# Patient Record
Sex: FEMALE | Race: WHITE | NOT HISPANIC OR LATINO | URBAN - METROPOLITAN AREA
[De-identification: names, ages, dates, MRNs, and addresses within clinical notes are randomized per-mention and may not be internally consistent; named-entity substitution may affect disease eponyms.]

---

## 2020-10-19 ENCOUNTER — APPOINTMENT (RX ONLY)
Dept: URBAN - METROPOLITAN AREA OTHER 5 | Facility: OTHER | Age: 60
Setting detail: DERMATOLOGY
End: 2020-10-19

## 2020-10-19 DIAGNOSIS — Z41.1 ENCOUNTER FOR COSMETIC SURGERY: ICD-10-CM

## 2020-10-19 PROCEDURE — ? PATIENT SPECIFIC COUNSELING

## 2020-10-19 PROCEDURE — ? BOTOX

## 2020-10-19 ASSESSMENT — LOCATION ZONE DERM: LOCATION ZONE: FACE

## 2020-10-19 ASSESSMENT — LOCATION SIMPLE DESCRIPTION DERM: LOCATION SIMPLE: RIGHT FOREHEAD

## 2020-10-19 ASSESSMENT — LOCATION DETAILED DESCRIPTION DERM: LOCATION DETAILED: RIGHT SUPERIOR MEDIAL FOREHEAD

## 2020-10-19 NOTE — PROCEDURE: BOTOX
Postcare Instructions: Patient instructed to not lie down for 4 hours and limit physical activity for 24 hours. Patient instructed not to travel by airplane for 48 hours.
Lot #: [id number]
Forehead Units: 10
Additional Area 1 Location: [facility]
Additional Area 2 Location: crow’s feet
Expiration Date (Month Year): 12/2022
Nasal Root Units: 0
Consent: Written consent was obtained prior to the procedure. Risks, benefits, expectations and alternatives were discussed including, but not limited to, infection, bleeding, lid/brow ptosis, bruising, swelling, diplopia, temporary effects, incomplete chemical denervation and dissatisfaction with the cosmetic outcome. No guarantee or warranty was given or implied regarding longevity of results.
Map Statment: See attached map for complete details
Dilution (U/0.1 Cc): 1.1
Detail Level: Detailed
Price Per Unit In $ (Use Numbers Only, No Text Please.): 17
Additional Area 1 Units: 5
Additional Area 2 Units: 15
Use Map Statement For Sites (Optional): Yes
Bill Summary Price Listed Below, Or Bill Total Of Units X Price Per Unit?: Bill #Units x Price Per Unit
Reconstitution Date: 10/19/2020

## 2020-10-19 NOTE — PROCEDURE: PATIENT SPECIFIC COUNSELING
Detail Level: Zone
Other (Free Text): Dr. Romero verbal dictation:She has recovered from her bout with Covid. She will soon want to schedule to go ahead with the breast and or facial surgery. For today she would like Botox.\\n\\nI prepped her skin and with informed consent injected TIMUR with five units of Botox 2.5 to each mentalis and 7.5 to each cross foot five units to each  2.5 units to each frontalis. She tolerated it well.

## 2020-12-21 ENCOUNTER — APPOINTMENT (RX ONLY)
Dept: URBAN - METROPOLITAN AREA OTHER 5 | Facility: OTHER | Age: 60
Setting detail: DERMATOLOGY
End: 2020-12-21

## 2020-12-21 DIAGNOSIS — Z41.1 ENCOUNTER FOR COSMETIC SURGERY: ICD-10-CM

## 2020-12-21 PROCEDURE — ? BOTOX

## 2020-12-21 PROCEDURE — ? CONSULTATION - AGING FACE

## 2020-12-21 PROCEDURE — ? PATIENT SPECIFIC COUNSELING

## 2020-12-21 ASSESSMENT — LOCATION ZONE DERM: LOCATION ZONE: FACE

## 2020-12-21 ASSESSMENT — LOCATION SIMPLE DESCRIPTION DERM: LOCATION SIMPLE: LEFT CHEEK

## 2020-12-21 ASSESSMENT — LOCATION DETAILED DESCRIPTION DERM: LOCATION DETAILED: LEFT INFERIOR CENTRAL MALAR CHEEK

## 2020-12-21 NOTE — PROCEDURE: PATIENT SPECIFIC COUNSELING
Detail Level: Zone
Other (Free Text): Dr. Morena Romero verbal dictation:\\n\\n  She would like Botox. I prepped her skin and with informed consent injected 2.5 units to each TIMUR ,2.5  units to each mentalis , 5 units to each crows foot , 2.5 units to each Frontalis , 5 units to each  , 2.5 units to the Procerus Which she tolerated rated well.

## 2020-12-21 NOTE — PROCEDURE: BOTOX
Platsyma Units: 0
Detail Level: Detailed
Reconstitution Date: 12/2020
Map Statment: See attached map for complete details
Additional Area 1 Units: 10
Postcare Instructions: Patient instructed to not lie down for 4 hours and limit physical activity for 24 hours. Patient instructed not to travel by airplane for 48 hours.
Additional Area 2 Units: 5
Bill Summary Price Listed Below, Or Bill Total Of Units X Price Per Unit?: Bill #Units x Price Per Unit
Show Price In Note?: yes
Price Per Unit In $ (Use Numbers Only, No Text Please.): 17
Dilution (U/0.1 Cc): 2.2
Additional Area 1 Location: crow?s
Consent: Written consent was obtained prior to the procedure. Risks, benefits, expectations and alternatives were discussed including, but not limited to, infection, bleeding, lid/brow ptosis, bruising, swelling, diplopia, temporary effects, incomplete chemical denervation and dissatisfaction with the cosmetic outcome. No guarantee or warranty was given or implied regarding longevity of results.
Glabellar Complex Units: 12.5
Expiration Date (Month Year): 08/2023
Additional Area 2 Location: [facility]
Lot #: [id number]

## 2021-07-16 ENCOUNTER — APPOINTMENT (RX ONLY)
Dept: URBAN - METROPOLITAN AREA OTHER 5 | Facility: OTHER | Age: 61
Setting detail: DERMATOLOGY
End: 2021-07-16

## 2021-07-16 DIAGNOSIS — Z41.1 ENCOUNTER FOR COSMETIC SURGERY: ICD-10-CM

## 2021-07-16 PROCEDURE — ? PATIENT SPECIFIC COUNSELING

## 2021-07-16 PROCEDURE — ? BOTOX

## 2021-07-16 ASSESSMENT — LOCATION DETAILED DESCRIPTION DERM: LOCATION DETAILED: LEFT INFERIOR CENTRAL MALAR CHEEK

## 2021-07-16 ASSESSMENT — LOCATION SIMPLE DESCRIPTION DERM: LOCATION SIMPLE: LEFT CHEEK

## 2021-07-16 ASSESSMENT — LOCATION ZONE DERM: LOCATION ZONE: FACE

## 2021-07-16 NOTE — HPI: FACE (AGING FACE)
Is This A New Presentation, Or A Follow-Up?: Follow Up Aging Face
Additional History: Patient presents today for botox

## 2021-07-16 NOTE — PROCEDURE: BOTOX
Dilution (U/0.1 Cc): 2.2
Wei Units: 0
Consent: Written consent was obtained prior to the procedure. Risks, benefits, expectations and alternatives were discussed including, but not limited to, infection, bleeding, lid/brow ptosis, bruising, swelling, diplopia, temporary effects, incomplete chemical denervation and dissatisfaction with the cosmetic outcome. No guarantee or warranty was given or implied regarding longevity of results.
Price Per Unit In $ (Use Numbers Only, No Text Please.): 17
Bill Summary Price Listed Below, Or Bill Total Of Units X Price Per Unit?: Bill #Units x Price Per Unit
Map Statment: See attached map for complete details
Use Map Statement For Sites (Optional): Yes
Additional Area 1 Units: 15
Detail Level: Detailed
Reconstitution Date: 07/2021
Lot #: [id number]
Postcare Instructions: Patient instructed to not lie down for 4 hours and limit physical activity for 24 hours. Patient instructed not to travel by airplane for 48 hours.
Forehead Units: 10
Glabellar Complex Units: 20
Additional Area 1 Location: crow’s feet
Expiration Date (Month Year): 10/2023

## 2021-07-16 NOTE — PROCEDURE: PATIENT SPECIFIC COUNSELING
Detail Level: Zone
Other (Free Text): Dr. Morena Romero verbal dictation:\\n\\n  She would like Botox. With informed consent I prepped her skin injected each crows foot with 7.5 units each  with 7.5 units, 5 Units to each frontalis and 5 units to the procerus us. She tolerated it well.

## 2021-10-08 ENCOUNTER — APPOINTMENT (RX ONLY)
Dept: URBAN - METROPOLITAN AREA OTHER 5 | Facility: OTHER | Age: 61
Setting detail: DERMATOLOGY
End: 2021-10-08

## 2021-10-08 DIAGNOSIS — Z41.1 ENCOUNTER FOR COSMETIC SURGERY: ICD-10-CM

## 2021-10-08 PROCEDURE — ? PRE-OP WORKLIST

## 2021-10-08 PROCEDURE — ? PRESCRIPTION

## 2021-10-08 PROCEDURE — ? BOTOX

## 2021-10-08 PROCEDURE — ? PATIENT SPECIFIC COUNSELING

## 2021-10-08 PROCEDURE — ? OTHER

## 2021-10-08 RX ORDER — CELECOXIB 200 MG/1
CAPSULE ORAL
Qty: 12 | Refills: 0 | Status: ERX | COMMUNITY
Start: 2021-10-08

## 2021-10-08 RX ADMIN — CELECOXIB: 200 CAPSULE ORAL at 00:00

## 2021-10-08 ASSESSMENT — LOCATION ZONE DERM: LOCATION ZONE: FACE

## 2021-10-08 ASSESSMENT — LOCATION SIMPLE DESCRIPTION DERM: LOCATION SIMPLE: LEFT CHEEK

## 2021-10-08 ASSESSMENT — LOCATION DETAILED DESCRIPTION DERM: LOCATION DETAILED: LEFT INFERIOR CENTRAL MALAR CHEEK

## 2021-10-08 NOTE — HPI: PREOPERATIVE APPOINTMENT
Has The Patient Completed Informed Consent?: is scheduled to complete informed consent documentation during this visit
Has The Patient Fulfilled Financial Responsibilities For Surgical Scheduling?: is scheduled to complete payment to fulfill financial responsibilities during this visit
Have Arrangements And Instructions Been Made For Patient Transportation?: transportation arrangements will be made
Date Of Procedure?: 10/21/2021
Facility: [facility]
Surgeon: Dr. Morena Romero
Additional History: Patient presents today for preop

## 2021-10-08 NOTE — PROCEDURE: BOTOX
Perioral Units: 0
Glabellar Complex Units: 10
Use Map Statement For Sites (Optional): Yes
Lot #: [id number]
Detail Level: Detailed
Additional Area 1 Location: crow’s feet
Consent: Written consent was obtained prior to the procedure. Risks, benefits, expectations and alternatives were discussed including, but not limited to, infection, bleeding, lid/brow ptosis, bruising, swelling, diplopia, temporary effects, incomplete chemical denervation and dissatisfaction with the cosmetic outcome. No guarantee or warranty was given or implied regarding longevity of results.
Price Per Unit In $ (Use Numbers Only, No Text Please.): 17
Expiration Date (Month Year): 11/2023
Bill Summary Price Listed Below, Or Bill Total Of Units X Price Per Unit?: Bill #Units x Price Per Unit
Dilution (U/0.1 Cc): 2.2
Reconstitution Date: 10/2021
Map Statment: See attached map for complete details
Additional Area 1 Units: 15
Postcare Instructions: Patient instructed to not lie down for 4 hours and limit physical activity for 24 hours. Patient instructed not to travel by airplane for 48 hours.

## 2021-10-08 NOTE — PROCEDURE: PRE-OP WORKLIST
Date Of Evaluation: 10/08/2021
Detail Level: Simple
Date Of Surgery: 10/21/2021
Surgeon: Dr Morena Romero
Photos Taken?: yes
Surgery Scheduled: Secondary Face/neck lift, fat grafting, facial peel

## 2021-10-08 NOTE — PROCEDURE: OTHER
Detail Level: Zone
Render Risk Assessment In Note?: no
Other (Free Text): Dr Romero dictation\\n\\n  She’s here for preop. We plan a secondary face and neck lift. A previous facelift was a short scar facelift so there’s no scars behind her ear given that she has a lot of skin laxity in the neck I suggested the incision along the occipital hairline. She has declined because she likes to wear her hair up. She also said she understands that I may not be able to tighten the neck as effectively.\\n\\nI’ve discussed the procedure risks likely outcome possible complications all of which she understands.\\n\\nI injected her with Botox today 7.5 units to the crows foot on each side. She had a symmetry of the brow so I injected to 7.5 to the right  of five to the left 2.5 to each frontalis and 2.5 to the procerus.

## 2021-10-12 RX ORDER — ONDANSETRON 8 MG/1
TABLET, ORALLY DISINTEGRATING ORAL
Qty: 10 | Refills: 0 | Status: ERX | COMMUNITY
Start: 2021-10-12

## 2021-10-12 RX ORDER — VALACYCLOVIR HYDROCHLORIDE 1 G/1
TABLET, FILM COATED ORAL
Qty: 10 | Refills: 0 | Status: ERX | COMMUNITY
Start: 2021-10-12

## 2021-10-12 RX ORDER — CLONIDINE HYDROCHLORIDE 0.1 MG/1
TABLET ORAL
Qty: 30 | Refills: 0 | Status: ERX | COMMUNITY
Start: 2021-10-12

## 2021-10-12 RX ORDER — TRIAMTERENE AND HYDROCHLOROTHIAZIDE 37.5; 25 MG/1; MG/1
TABLET ORAL
Qty: 30 | Refills: 0 | Status: ERX | COMMUNITY
Start: 2021-10-12

## 2021-10-12 RX ORDER — GABAPENTIN 300 MG/1
CAPSULE ORAL
Qty: 12 | Refills: 0 | Status: ERX | COMMUNITY
Start: 2021-10-12

## 2021-10-12 RX ORDER — METHYLPREDNISOLONE 4 MG/1
TABLET ORAL
Qty: 1 | Refills: 0 | Status: ERX | COMMUNITY
Start: 2021-10-12

## 2021-10-12 RX ADMIN — METHYLPREDNISOLONE: 4 TABLET ORAL at 00:00

## 2021-10-12 RX ADMIN — VALACYCLOVIR HYDROCHLORIDE: 1 TABLET, FILM COATED ORAL at 00:00

## 2021-10-12 RX ADMIN — GABAPENTIN: 300 CAPSULE ORAL at 00:00

## 2021-10-12 RX ADMIN — TRIAMTERENE AND HYDROCHLOROTHIAZIDE: 37.5; 25 TABLET ORAL at 00:00

## 2021-10-12 RX ADMIN — CLONIDINE HYDROCHLORIDE: 0.1 TABLET ORAL at 00:00

## 2021-10-12 RX ADMIN — ONDANSETRON: 8 TABLET, ORALLY DISINTEGRATING ORAL at 00:00

## 2021-10-25 ENCOUNTER — APPOINTMENT (RX ONLY)
Dept: URBAN - METROPOLITAN AREA OTHER 5 | Facility: OTHER | Age: 61
Setting detail: DERMATOLOGY
End: 2021-10-25

## 2021-10-25 VITALS — HEART RATE: 85 BPM | SYSTOLIC BLOOD PRESSURE: 122 MMHG | DIASTOLIC BLOOD PRESSURE: 93 MMHG | RESPIRATION RATE: 16 BRPM

## 2021-10-25 DIAGNOSIS — Z48.89 ENCOUNTER FOR OTHER SPECIFIED SURGICAL AFTERCARE: ICD-10-CM

## 2021-10-25 PROCEDURE — 99024 POSTOP FOLLOW-UP VISIT: CPT

## 2021-10-25 PROCEDURE — ? PATIENT SPECIFIC COUNSELING

## 2021-10-25 PROCEDURE — ? OTHER

## 2021-10-25 PROCEDURE — ? COUNSELING - POST-OP CHECK, FACELIFT

## 2021-10-25 ASSESSMENT — LOCATION DETAILED DESCRIPTION DERM: LOCATION DETAILED: LEFT INFERIOR CENTRAL MALAR CHEEK

## 2021-10-25 ASSESSMENT — LOCATION SIMPLE DESCRIPTION DERM: LOCATION SIMPLE: LEFT CHEEK

## 2021-10-25 ASSESSMENT — LOCATION ZONE DERM: LOCATION ZONE: FACE

## 2021-10-25 NOTE — PROCEDURE: PATIENT SPECIFIC COUNSELING
Other (Free Text): Dr. Morena Romero verbal dictation:\\n\\n  She is four days postop and doing well. No hematoma ,no seroma,  no sign of infection or DVT.\\n\\nShe wanted to take a pain pill before we injected her for the drain removal. Following her taking the pain pill I infiltrated with xylocaine and epi then removed the drains without any problems.\\n\\nI have given her instructions to avoid any physical activities. Continue to apply the ointment to the suture lines.\\n\\nShe’ll return on Friday for removal of the staples and sutures.
Detail Level: Zone

## 2021-10-27 ENCOUNTER — APPOINTMENT (RX ONLY)
Dept: URBAN - METROPOLITAN AREA OTHER 5 | Facility: OTHER | Age: 61
Setting detail: DERMATOLOGY
End: 2021-10-27

## 2021-10-27 DIAGNOSIS — Z48.89 ENCOUNTER FOR OTHER SPECIFIED SURGICAL AFTERCARE: ICD-10-CM

## 2021-10-27 PROCEDURE — 99024 POSTOP FOLLOW-UP VISIT: CPT

## 2021-10-27 PROCEDURE — ? OTHER

## 2021-10-27 PROCEDURE — ? PATIENT SPECIFIC COUNSELING

## 2021-10-27 PROCEDURE — ? COUNSELING - POST-OP CHECK, FACELIFT

## 2021-10-27 ASSESSMENT — LOCATION ZONE DERM: LOCATION ZONE: FACE

## 2021-10-27 ASSESSMENT — LOCATION DETAILED DESCRIPTION DERM: LOCATION DETAILED: LEFT INFERIOR CENTRAL MALAR CHEEK

## 2021-10-27 ASSESSMENT — LOCATION SIMPLE DESCRIPTION DERM: LOCATION SIMPLE: LEFT CHEEK

## 2021-10-27 NOTE — PROCEDURE: PATIENT SPECIFIC COUNSELING
Detail Level: Zone
Other (Free Text): Dr. Morena Romero verbal dictation :\\n\\n   Six days post op.She sent us a photo earlier today saying the right side of her neck was swollen. On the image I was concerned she may have a seroma. We asked her to come in.\\n\\nOn exam both sides of her neck are fine. No seroma and no hematoma. Just the usual swelling. I reassured her and we removed half of her staples today.

## 2021-10-27 NOTE — HPI: POST-OP CHECK, FACELIFT
History: Patient presents today post op facelift\\nPatient was concerning about swelling right neck area

## 2021-11-05 ENCOUNTER — APPOINTMENT (RX ONLY)
Dept: URBAN - METROPOLITAN AREA OTHER 5 | Facility: OTHER | Age: 61
Setting detail: DERMATOLOGY
End: 2021-11-05

## 2021-11-05 DIAGNOSIS — Z48.89 ENCOUNTER FOR OTHER SPECIFIED SURGICAL AFTERCARE: ICD-10-CM

## 2021-11-05 PROCEDURE — ? PATIENT SPECIFIC COUNSELING

## 2021-11-05 PROCEDURE — ? COUNSELING - POST-OP CHECK, FACELIFT

## 2021-11-05 PROCEDURE — ? OTHER

## 2021-11-05 PROCEDURE — ? COUNSELING - POST-OP CHECK, FAT TRANSFER

## 2021-11-05 PROCEDURE — 99024 POSTOP FOLLOW-UP VISIT: CPT

## 2021-11-05 ASSESSMENT — LOCATION DETAILED DESCRIPTION DERM
LOCATION DETAILED: LEFT INFERIOR MEDIAL MALAR CHEEK
LOCATION DETAILED: LEFT INFERIOR CENTRAL MALAR CHEEK

## 2021-11-05 ASSESSMENT — LOCATION ZONE DERM: LOCATION ZONE: FACE

## 2021-11-05 ASSESSMENT — LOCATION SIMPLE DESCRIPTION DERM: LOCATION SIMPLE: LEFT CHEEK

## 2021-11-05 NOTE — PROCEDURE: PATIENT SPECIFIC COUNSELING
Detail Level: Zone
Other (Free Text): Dr. Morena Romero verbal dictation:\\n\\n  She’s just over two weeks postop doing well and pleased with the early results. The only concern is that she feels the left ear lobe is attached and the right is free.\\n\\nExamination\\n\\nShe looks good. No hematoma ,no seroma ,and no sign of infection. All the wounds have healed. There is an asymmetry of the earlobes and I explain to her that once everything has settled we can consider  revision.\\nI removed a few sutures. We’ve given her instructions for the further care of her face and I’ll see her again in three weeks.

## 2021-11-05 NOTE — PROCEDURE: COUNSELING - POST-OP CHECK, FAT TRANSFER
Add Postop Global No-Charge Code (31428)?: yes
Count Minor/Major Decisions Toward Mdm (Not Cosmetic)?: No
Detail Level: Simple

## 2021-11-22 ENCOUNTER — APPOINTMENT (RX ONLY)
Dept: URBAN - METROPOLITAN AREA OTHER 5 | Facility: OTHER | Age: 61
Setting detail: DERMATOLOGY
End: 2021-11-22

## 2021-11-22 DIAGNOSIS — Z48.89 ENCOUNTER FOR OTHER SPECIFIED SURGICAL AFTERCARE: ICD-10-CM

## 2021-11-22 PROCEDURE — ? OTHER

## 2021-11-22 PROCEDURE — 99024 POSTOP FOLLOW-UP VISIT: CPT

## 2021-11-22 PROCEDURE — ? COUNSELING - POST-OP CHECK, FACELIFT

## 2021-11-22 PROCEDURE — ? COUNSELING - POST-OP CHECK, FAT TRANSFER

## 2021-11-22 PROCEDURE — ? PATIENT SPECIFIC COUNSELING

## 2021-11-22 ASSESSMENT — LOCATION ZONE DERM: LOCATION ZONE: FACE

## 2021-11-22 ASSESSMENT — LOCATION DETAILED DESCRIPTION DERM
LOCATION DETAILED: LEFT CENTRAL MALAR CHEEK
LOCATION DETAILED: LEFT INFERIOR CENTRAL MALAR CHEEK

## 2021-11-22 ASSESSMENT — LOCATION SIMPLE DESCRIPTION DERM: LOCATION SIMPLE: LEFT CHEEK

## 2021-11-22 NOTE — PROCEDURE: COUNSELING - POST-OP CHECK, FAT TRANSFER
Detail Level: Simple
Count Minor/Major Decisions Toward Mdm (Not Cosmetic)?: No
Add Postop Global No-Charge Code (44853)?: yes

## 2021-11-22 NOTE — HPI: POST-OP CHECK, FACELIFT
History: Patient had concerns regarding crusting in the neck hairline incision. Possible suture.Patient presents today 1 month post op Face/ Necklift with fat transfer\\nSurgery 10/21/2021

## 2021-11-22 NOTE — PROCEDURE: PATIENT SPECIFIC COUNSELING
Other (Free Text): Verbal dictation: Patient seen today by Donna Saenz. Patient was concerned about crusting neck hairline incision. I removed the crusting and cleansed the area. There was not a suture present and visible at this time. Patient was also concerned regarding swelling bilateral lowerlids. I recommended to patient to give the swelling a little more time to dissipate. She will follow up with Dr. Romero on Wednesday and ask about any medications that would get rid of the swelling.
Detail Level: Zone

## 2021-11-24 ENCOUNTER — APPOINTMENT (RX ONLY)
Dept: URBAN - METROPOLITAN AREA OTHER 5 | Facility: OTHER | Age: 61
Setting detail: DERMATOLOGY
End: 2021-11-24

## 2021-11-24 DIAGNOSIS — Z48.89 ENCOUNTER FOR OTHER SPECIFIED SURGICAL AFTERCARE: ICD-10-CM

## 2021-11-24 PROCEDURE — ? OTHER

## 2021-11-24 PROCEDURE — ? COUNSELING - POST-OP CHECK, FACELIFT

## 2021-11-24 PROCEDURE — ? PATIENT SPECIFIC COUNSELING

## 2021-11-24 PROCEDURE — ? COUNSELING - POST-OP CHECK, FAT TRANSFER

## 2021-11-24 PROCEDURE — 99024 POSTOP FOLLOW-UP VISIT: CPT

## 2021-11-24 ASSESSMENT — LOCATION ZONE DERM: LOCATION ZONE: FACE

## 2021-11-24 ASSESSMENT — LOCATION DETAILED DESCRIPTION DERM: LOCATION DETAILED: LEFT INFERIOR CENTRAL MALAR CHEEK

## 2021-11-24 ASSESSMENT — LOCATION SIMPLE DESCRIPTION DERM: LOCATION SIMPLE: LEFT CHEEK

## 2021-11-24 NOTE — PROCEDURE: COUNSELING - POST-OP CHECK, FAT TRANSFER
Add Postop Global No-Charge Code (62433)?: yes
Count Minor/Major Decisions Toward Mdm (Not Cosmetic)?: No
Detail Level: Simple

## 2021-11-24 NOTE — PROCEDURE: OTHER
Other (Free Text): Recommended that patient start Dyazide
Render Risk Assessment In Note?: no
Detail Level: Zone

## 2021-12-17 ENCOUNTER — APPOINTMENT (RX ONLY)
Dept: URBAN - METROPOLITAN AREA OTHER 5 | Facility: OTHER | Age: 61
Setting detail: DERMATOLOGY
End: 2021-12-17

## 2021-12-17 DIAGNOSIS — Z41.1 ENCOUNTER FOR COSMETIC SURGERY: ICD-10-CM

## 2021-12-17 DIAGNOSIS — Z48.89 ENCOUNTER FOR OTHER SPECIFIED SURGICAL AFTERCARE: ICD-10-CM

## 2021-12-17 PROCEDURE — 99024 POSTOP FOLLOW-UP VISIT: CPT

## 2021-12-17 PROCEDURE — ? COUNSELING - POST-OP CHECK, FAT TRANSFER

## 2021-12-17 PROCEDURE — ? PATIENT SPECIFIC COUNSELING

## 2021-12-17 PROCEDURE — ? COUNSELING - POST-OP CHECK, FACELIFT

## 2021-12-17 PROCEDURE — ? BOTOX

## 2021-12-17 PROCEDURE — ? OTHER

## 2021-12-17 ASSESSMENT — LOCATION DETAILED DESCRIPTION DERM: LOCATION DETAILED: LEFT INFERIOR CENTRAL MALAR CHEEK

## 2021-12-17 ASSESSMENT — LOCATION SIMPLE DESCRIPTION DERM: LOCATION SIMPLE: LEFT CHEEK

## 2021-12-17 ASSESSMENT — LOCATION ZONE DERM: LOCATION ZONE: FACE

## 2021-12-17 NOTE — PROCEDURE: PATIENT SPECIFIC COUNSELING
Other (Free Text): Dr. Morena Romero Verbal Dictation:\\n\\n  She’s very pleased with the results of the facelift and she looks good. The scars have all matured and all imperceptible. She’s pleased with her earlobes and they are no longer of concern.\\n\\nShe would like Botox. She has some brow asymmetry and I marked her accordingly. I injected each crows foot with 7.5 units of Botox, 5 units to each  , 5 units to the procerus . I’ll see her again in three months
Detail Level: Zone

## 2021-12-17 NOTE — PROCEDURE: COUNSELING - POST-OP CHECK, FAT TRANSFER
Add Postop Global No-Charge Code (08097)?: yes
Count Minor/Major Decisions Toward Mdm (Not Cosmetic)?: No
Detail Level: Simple

## 2021-12-17 NOTE — PROCEDURE: BOTOX
Summary Price $ (Use Numbers Only, No Text Please.): 595
Expiration Date (Month Year): 02/2024
Forehead Units: 5
Dilution (U/0.1 Cc): 4
Postcare Instructions: Patient instructed to not lie down for 4 hours and limit physical activity for 24 hours. Patient instructed not to travel by airplane for 48 hours.
Bill Summary Price Listed Below, Or Bill Total Of Units X Price Per Unit?: Bill Summary Price Below
Wei Units: 0
Periorbital Skin Units: 15
Map Statment: See attached map for complete details
Detail Level: Detailed
Show Price In Note?: no
Price Per Unit In $ (Use Numbers Only, No Text Please.): 17
Reconstitution Date: 12/2021
Consent: Written consent was obtained prior to the procedure. Risks, benefits, expectations and alternatives were discussed including, but not limited to, infection, bleeding, lid/brow ptosis, bruising, swelling, diplopia, temporary effects, incomplete chemical denervation and dissatisfaction with the cosmetic outcome. No guarantee or warranty was given or implied regarding longevity of results.
Lot #: [id number]

## 2022-05-09 ENCOUNTER — APPOINTMENT (RX ONLY)
Dept: URBAN - METROPOLITAN AREA OTHER 5 | Facility: OTHER | Age: 62
Setting detail: DERMATOLOGY
End: 2022-05-09

## 2022-05-09 DIAGNOSIS — Z41.1 ENCOUNTER FOR COSMETIC SURGERY: ICD-10-CM

## 2022-05-09 PROCEDURE — ? PATIENT SPECIFIC COUNSELING

## 2022-05-09 PROCEDURE — ? CONSULTATION - BREAST (COSMETIC)

## 2022-05-09 PROCEDURE — ? OTHER

## 2022-05-09 ASSESSMENT — LOCATION SIMPLE DESCRIPTION DERM
LOCATION SIMPLE: RIGHT BREAST
LOCATION SIMPLE: LEFT BREAST

## 2022-05-09 ASSESSMENT — LOCATION ZONE DERM: LOCATION ZONE: TRUNK

## 2022-05-09 ASSESSMENT — LOCATION DETAILED DESCRIPTION DERM
LOCATION DETAILED: LEFT MEDIAL BREAST 7-8:00 REGION
LOCATION DETAILED: RIGHT MEDIAL BREAST 5-6:00 REGION

## 2022-05-09 NOTE — HPI: BREAST (AESTHETIC DEFORMITY, BREAST)
Is This A New Presentation, Or A Follow-Up?: other
Additional History: Patient presents today to [facility] breast surgery

## 2022-05-09 NOTE — PROCEDURE: CONSULTATION - BREAST (COSMETIC)
Consultation Charge $ (Use Numbers Only, No Text Please.): 0
Count Minor/Major Decisions Toward Mdm (Not Cosmetic)?: No
Detail Level: Detailed

## 2022-05-27 ENCOUNTER — APPOINTMENT (RX ONLY)
Dept: URBAN - METROPOLITAN AREA OTHER 5 | Facility: OTHER | Age: 62
Setting detail: DERMATOLOGY
End: 2022-05-27

## 2022-05-27 DIAGNOSIS — Z41.1 ENCOUNTER FOR COSMETIC SURGERY: ICD-10-CM

## 2022-05-27 PROCEDURE — ? BOTOX

## 2022-05-27 PROCEDURE — ? PATIENT SPECIFIC COUNSELING

## 2022-05-27 ASSESSMENT — LOCATION SIMPLE DESCRIPTION DERM: LOCATION SIMPLE: LEFT CHEEK

## 2022-05-27 ASSESSMENT — LOCATION ZONE DERM: LOCATION ZONE: FACE

## 2022-05-27 ASSESSMENT — LOCATION DETAILED DESCRIPTION DERM: LOCATION DETAILED: LEFT INFERIOR CENTRAL MALAR CHEEK

## 2022-05-27 NOTE — PROCEDURE: BOTOX
Lot #: [id number]
Use Map Statement For Sites (Optional): Yes
Perioral Units: 0
Detail Level: Detailed
Postcare Instructions: Patient instructed to not lie down for 4 hours and limit physical activity for 24 hours. Patient instructed not to travel by airplane for 48 hours.
Additional Area 1 Units: 10
Map Statment: See attached map for complete details
Reconstitution Date: 5/2022
Bill Summary Price Listed Below, Or Bill Total Of Units X Price Per Unit?: Bill #Units x Price Per Unit
Price Per Unit In $ (Use Numbers Only, No Text Please.): 17
Dilution (U/0.1 Cc): 2.2
Glabellar Complex Units: 15
Expiration Date (Month Year): 5/2024
Additional Area 1 Location: crow’s feet
Consent: Written consent was obtained prior to the procedure. Risks, benefits, expectations and alternatives were discussed including, but not limited to, infection, bleeding, lid/brow ptosis, bruising, swelling, diplopia, temporary effects, incomplete chemical denervation and dissatisfaction with the cosmetic outcome. No guarantee or warranty was given or implied regarding longevity of results.

## 2022-06-22 ENCOUNTER — APPOINTMENT (RX ONLY)
Dept: URBAN - METROPOLITAN AREA OTHER 5 | Facility: OTHER | Age: 62
Setting detail: DERMATOLOGY
End: 2022-06-22

## 2022-06-22 DIAGNOSIS — Z41.1 ENCOUNTER FOR COSMETIC SURGERY: ICD-10-CM

## 2022-06-22 PROCEDURE — ? PRESCRIPTION

## 2022-06-22 PROCEDURE — ? PATIENT SPECIFIC COUNSELING

## 2022-06-22 PROCEDURE — ? PRE-OP WORKLIST

## 2022-06-22 ASSESSMENT — LOCATION DETAILED DESCRIPTION DERM
LOCATION DETAILED: RIGHT MEDIAL BREAST 5-6:00 REGION
LOCATION DETAILED: SUPERIOR LUMBAR SPINE
LOCATION DETAILED: LEFT MEDIAL BREAST 7-8:00 REGION

## 2022-06-22 ASSESSMENT — LOCATION ZONE DERM: LOCATION ZONE: TRUNK

## 2022-06-22 ASSESSMENT — LOCATION SIMPLE DESCRIPTION DERM
LOCATION SIMPLE: LEFT BREAST
LOCATION SIMPLE: LOWER BACK
LOCATION SIMPLE: RIGHT BREAST

## 2022-06-22 NOTE — PROCEDURE: PRE-OP WORKLIST
Surgery Scheduled: Liposuction back, implant exchange, fat graft to breast, Capsulectomy
Date Of Surgery: 07/05/2022
Detail Level: Simple
Date Of Evaluation: 6/22/2022
Photos Taken?: yes
Surgeon: Dr Morena Romero

## 2022-06-22 NOTE — PROCEDURE: PATIENT SPECIFIC COUNSELING
Other (Free Text): Dr. Morena Romero verbal dictation:\\n\\nShe’s here for preop. We have discussed our plan is to remove the implants and as much of the capsules as possible then replace the implants and fat graft. She has severe asymmetry of the areola. I have told her that I’ll do my best but she may need a revision or two under local anesthetic.\\n\\nWe discussed each of the procedures risks ,likely outcome ,possible complications including recurrent capsular contracture and the need for revisions. She understands and we will proceed as scheduled.
Detail Level: Zone

## 2022-06-22 NOTE — HPI: PREOPERATIVE APPOINTMENT
Date Of Procedure?: 07/05/2022
Facility: [facility]
Surgeon: Dr. Morena Romero
Additional History: Patient presents today for preop\\nSurgery 07/05/2022

## 2022-06-30 RX ORDER — GABAPENTIN 300 MG/1
CAPSULE ORAL
Qty: 12 | Refills: 0 | Status: ERX

## 2022-06-30 RX ORDER — CELECOXIB 200 MG/1
CAPSULE ORAL
Qty: 12 | Refills: 0 | Status: ERX

## 2022-06-30 RX ORDER — ONDANSETRON 8 MG/1
TABLET, ORALLY DISINTEGRATING ORAL
Qty: 10 | Refills: 0 | Status: ERX

## 2022-07-05 ENCOUNTER — APPOINTMENT (RX ONLY)
Dept: URBAN - METROPOLITAN AREA OTHER 5 | Facility: OTHER | Age: 62
Setting detail: DERMATOLOGY
End: 2022-07-05

## 2022-07-08 ENCOUNTER — APPOINTMENT (RX ONLY)
Dept: URBAN - METROPOLITAN AREA OTHER 5 | Facility: OTHER | Age: 62
Setting detail: DERMATOLOGY
End: 2022-07-08

## 2022-07-08 DIAGNOSIS — Z48.89 ENCOUNTER FOR OTHER SPECIFIED SURGICAL AFTERCARE: ICD-10-CM

## 2022-07-08 PROCEDURE — ? PATIENT SPECIFIC COUNSELING

## 2022-07-08 PROCEDURE — 99024 POSTOP FOLLOW-UP VISIT: CPT

## 2022-07-08 PROCEDURE — ? COUNSELING - POST-OP CHECK, BREAST IMPLANT EXCHANGE

## 2022-07-08 NOTE — PROCEDURE: PATIENT SPECIFIC COUNSELING
Other (Free Text): Dr.Foad Romero verbal dictation:\\n\\nShe’s three days postop and doing well. She said she was very comfortable until overnight when she had a lot of pain. She’s almost out of the Codeine.\\n\\nExamination\\n\\nShe has expected swelling and bruising ,no hematoma ,no seroma ,and no sign of infection. The wounds are progressing well. I infiltrated with Xylocaine with epi around the drains and removed them.\\n\\nSmani has a little swelling of the breast but no seroma so we started her on Dyazide. We gave her six more codeine and some Xanax.\\n\\nI’ll see her again in one week.
Detail Level: Zone

## 2022-07-08 NOTE — PROCEDURE: COUNSELING - POST-OP CHECK, BREAST IMPLANT EXCHANGE
Detail Level: Simple
Count Minor/Major Decisions Toward Mdm (Not Cosmetic)?: No
Add Postop Global No-Charge Code (01621)?: yes

## 2022-07-08 NOTE — HPI: POST-OP CHECK
History: Post op check surgery 7/05/22 Liposuction back, implant exchange, fat graft to breast, Capsulectomy.

## 2022-07-15 ENCOUNTER — APPOINTMENT (RX ONLY)
Dept: URBAN - METROPOLITAN AREA OTHER 5 | Facility: OTHER | Age: 62
Setting detail: DERMATOLOGY
End: 2022-07-15

## 2022-07-15 DIAGNOSIS — Z48.89 ENCOUNTER FOR OTHER SPECIFIED SURGICAL AFTERCARE: ICD-10-CM

## 2022-07-15 PROCEDURE — ? COUNSELING - POST-OP CHECK, FAT TRANSFER

## 2022-07-15 PROCEDURE — ? PATIENT SPECIFIC COUNSELING

## 2022-07-15 PROCEDURE — 99024 POSTOP FOLLOW-UP VISIT: CPT

## 2022-07-15 NOTE — PROCEDURE: PATIENT SPECIFIC COUNSELING
Detail Level: Zone
Other (Free Text): Dr.Foad Romero verbal dictation:\\n\\nHer breasts look great. They’re soft and symmetrical. No infection ,no seroma ,and no issues with the grafted fat. No implant related problems.\\n\\nShe had a few sutures that we removed. I have given her instructions for further care of her breasts.\\n\\nI’ll see her again in one month.

## 2022-07-15 NOTE — PROCEDURE: COUNSELING - POST-OP CHECK, FAT TRANSFER
Add Postop Global No-Charge Code (33898)?: yes
Detail Level: Simple
Count Minor/Major Decisions Toward Mdm (Not Cosmetic)?: No

## 2022-07-29 ENCOUNTER — APPOINTMENT (RX ONLY)
Dept: URBAN - METROPOLITAN AREA OTHER 5 | Facility: OTHER | Age: 62
Setting detail: DERMATOLOGY
End: 2022-07-29

## 2022-07-29 DIAGNOSIS — L72.0 EPIDERMAL CYST: ICD-10-CM

## 2022-07-29 DIAGNOSIS — L57.8 OTHER SKIN CHANGES DUE TO CHRONIC EXPOSURE TO NONIONIZING RADIATION: ICD-10-CM

## 2022-07-29 DIAGNOSIS — L60.3 NAIL DYSTROPHY: ICD-10-CM

## 2022-07-29 PROCEDURE — ? TREATMENT REGIMEN

## 2022-07-29 PROCEDURE — 99213 OFFICE O/P EST LOW 20 MIN: CPT

## 2022-07-29 PROCEDURE — ? EXTRACTIONS

## 2022-07-29 PROCEDURE — ? COUNSELING

## 2022-07-29 ASSESSMENT — LOCATION SIMPLE DESCRIPTION DERM
LOCATION SIMPLE: RIGHT FOREARM
LOCATION SIMPLE: LEFT FOREARM
LOCATION SIMPLE: LEFT GREAT TOE

## 2022-07-29 ASSESSMENT — LOCATION ZONE DERM
LOCATION ZONE: TOENAIL
LOCATION ZONE: ARM

## 2022-07-29 ASSESSMENT — LOCATION DETAILED DESCRIPTION DERM
LOCATION DETAILED: LEFT PROXIMAL DORSAL FOREARM
LOCATION DETAILED: RIGHT PROXIMAL DORSAL FOREARM
LOCATION DETAILED: LEFT GREAT TOENAIL

## 2022-07-29 NOTE — PROCEDURE: EXTRACTIONS
Detail Level: Simple
Render Number Of Lesions Treated: no
Consent was obtained and risks were reviewed including but not limited to scarring, infection, bleeding, scabbing, incomplete removal, and allergy to anesthesia.
Extraction Method: sterile needle
Prep Text (Optional): Prior to removal the treatment areas were prepped in the usual fashion.
Acne Type: A milial cyst
Post-Care Instructions: I reviewed with the patient in detail post-care instructions. Patient is to keep the treatment areas dry overnight, and then apply bacitracin twice daily until healed. Patient may apply hydrogen peroxide soaks to remove any crusting.

## 2022-07-29 NOTE — PROCEDURE: TREATMENT REGIMEN
Otc Regimen: DerMend lotion\\nCollagen peptides: 1 scoop a day into coffee or tea
Detail Level: Zone
Otc Regimen: Veralac or Isdin nail

## 2022-07-29 NOTE — HPI: NAIL DYSTROPHY
How Severe Is It?: moderate
Is This A New Presentation, Or A Follow-Up?: Nail Dystrophy
Additional History: Pt has had it cultured before, came back as not being fungal.

## 2022-09-30 ENCOUNTER — RX ONLY (OUTPATIENT)
Age: 62
Setting detail: RX ONLY
End: 2022-09-30

## 2022-09-30 ENCOUNTER — APPOINTMENT (RX ONLY)
Dept: URBAN - METROPOLITAN AREA OTHER 5 | Facility: OTHER | Age: 62
Setting detail: DERMATOLOGY
End: 2022-09-30

## 2022-09-30 DIAGNOSIS — Z48.89 ENCOUNTER FOR OTHER SPECIFIED SURGICAL AFTERCARE: ICD-10-CM

## 2022-09-30 DIAGNOSIS — Z41.1 ENCOUNTER FOR COSMETIC SURGERY: ICD-10-CM

## 2022-09-30 PROCEDURE — ? PATIENT SPECIFIC COUNSELING

## 2022-09-30 PROCEDURE — 99024 POSTOP FOLLOW-UP VISIT: CPT

## 2022-09-30 PROCEDURE — ? BOTOX

## 2022-09-30 PROCEDURE — ? COUNSELING - POST-OP CHECK, BREAST IMPLANT EXCHANGE

## 2022-09-30 PROCEDURE — ? COUNSELING - POST-OP CHECK, FAT TRANSFER

## 2022-09-30 PROCEDURE — ? COUNSELING - POST-OP CHECK, LIPOSUCTION

## 2022-09-30 PROCEDURE — ? OTHER

## 2022-09-30 RX ORDER — MONTELUKAST SODIUM 10 MG/1
TABLET ORAL
Qty: 90 | Refills: 6 | Status: ERX | COMMUNITY
Start: 2022-09-30

## 2022-09-30 ASSESSMENT — LOCATION SIMPLE DESCRIPTION DERM
LOCATION SIMPLE: LEFT BREAST
LOCATION SIMPLE: RIGHT BREAST
LOCATION SIMPLE: LOWER BACK
LOCATION SIMPLE: LEFT CHEEK

## 2022-09-30 ASSESSMENT — LOCATION DETAILED DESCRIPTION DERM
LOCATION DETAILED: RIGHT MEDIAL BREAST 5-6:00 REGION
LOCATION DETAILED: SUPERIOR LUMBAR SPINE
LOCATION DETAILED: RIGHT MEDIAL BREAST 2-3:00 REGION
LOCATION DETAILED: LEFT MEDIAL BREAST 11-12:00 REGION
LOCATION DETAILED: LEFT SUPERIOR CENTRAL BUCCAL CHEEK
LOCATION DETAILED: LEFT MEDIAL BREAST 6-7:00 REGION

## 2022-09-30 ASSESSMENT — LOCATION ZONE DERM
LOCATION ZONE: TRUNK
LOCATION ZONE: FACE

## 2022-09-30 NOTE — PROCEDURE: COUNSELING - POST-OP CHECK, FAT TRANSFER
Detail Level: Simple
Add Postop Global No-Charge Code (98461)?: yes
Count Minor/Major Decisions Toward Mdm (Not Cosmetic)?: No

## 2022-09-30 NOTE — PROCEDURE: BOTOX
Additional Area 3 Units: 0
Consent: Written consent was obtained prior to the procedure. Risks, benefits, expectations and alternatives were discussed including, but not limited to, infection, bleeding, lid/brow ptosis, bruising, swelling, diplopia, temporary effects, incomplete chemical denervation and dissatisfaction with the cosmetic outcome. No guarantee or warranty was given or implied regarding longevity of results.
Additional Area 1 Units: 15
Bill Summary Price Listed Below, Or Bill Total Of Units X Price Per Unit?: Bill #Units x Price Per Unit
Price Per Unit In $ (Use Numbers Only, No Text Please.): 18
Additional Area 1 Location: crow’s feet
Expiration Date (Month Year): 07/2024
Dilution (U/0.1 Cc): 2.2
Reconstitution Date: 09/202
Lot #: [id number]
Forehead Units: 10
Map Statment: See attached map for complete details
Show Price In Note?: yes
Postcare Instructions: Patient instructed to not lie down for 4 hours and limit physical activity for 24 hours. Patient instructed not to travel by airplane for 48 hours.
Detail Level: Detailed

## 2022-09-30 NOTE — HPI: POST-OP CHECK, BREAST
History: Patient presents today 2 months 3 weeks post op liposuction to back , capsulectomy with implant exchange bilateral, fat grafting \\nSurgery 7/5/2022

## 2022-09-30 NOTE — PROCEDURE: COUNSELING - POST-OP CHECK, BREAST IMPLANT EXCHANGE
Detail Level: Simple
Add Postop Global No-Charge Code (01796)?: yes
Count Minor/Major Decisions Toward Mdm (Not Cosmetic)?: No

## 2022-09-30 NOTE — PROCEDURE: COUNSELING - POST-OP CHECK, LIPOSUCTION
Add Postop Global No-Charge Code (93617)?: yes
Count Minor/Major Decisions Toward Mdm (Not Cosmetic)?: No
Detail Level: Simple

## 2022-09-30 NOTE — PROCEDURE: PATIENT SPECIFIC COUNSELING
Detail Level: Zone
Other (Free Text): Dr. Morena Romero verbal dictation:\\n\\nApparently she fell in the office striking her right forearm area. She has a bruise and a small firm blood clot under the skin. The area is not liquid enough for me to aspirate. I’ve told her the once it  liquefies we can aspirated.\\n\\nThe breasts are symmetrical and soft.  We will start her on Singulair and the displacement massage.\\n\\nToday I injected her with Botox 7.5 units to each Crowsfoot ,7.5 units to each  , and 5 units to each frontalis.

## 2022-09-30 NOTE — PROCEDURE: OTHER
Detail Level: Zone
Other (Free Text): Recommended that patient start breast massage exercises \\n- prescription sent to patients pharmacy for preventative measure for scar tissue
Render Risk Assessment In Note?: no

## 2023-03-03 ENCOUNTER — APPOINTMENT (RX ONLY)
Dept: URBAN - METROPOLITAN AREA OTHER 5 | Facility: OTHER | Age: 63
Setting detail: DERMATOLOGY
End: 2023-03-03

## 2023-03-03 DIAGNOSIS — Z41.1 ENCOUNTER FOR COSMETIC SURGERY: ICD-10-CM

## 2023-03-03 PROCEDURE — ? BOTOX

## 2023-03-03 NOTE — PROCEDURE: BOTOX
Forehead Units: 15
Expiration Date (Month Year): 02/28/2025
Perioral Units: 0
Price Per Unit In $ (Use Numbers Only, No Text Please.): 18
Additional Area 1 Location: crow’s feet
Consent: Written consent was obtained prior to the procedure. Risks, benefits, expectations and alternatives were discussed including, but not limited to, infection, bleeding, lid/brow ptosis, bruising, swelling, diplopia, temporary effects, incomplete chemical denervation and dissatisfaction with the cosmetic outcome. No guarantee or warranty was given or implied regarding longevity of results.
Dilution (U/0.1 Cc): 2.2
Bill Summary Price Listed Below, Or Bill Total Of Units X Price Per Unit?: Bill #Units x Price Per Unit
Postcare Instructions: Patient instructed to not lie down for 4 hours and limit physical activity for 24 hours. Patient instructed not to travel by airplane for 48 hours.
Show Price In Note?: yes
Lot #: [id number]
Map Statment: See attached map for complete details
Reconstitution Date: 03/03/2023
Detail Level: Detailed

## 2023-04-14 ENCOUNTER — APPOINTMENT (RX ONLY)
Dept: URBAN - METROPOLITAN AREA OTHER 5 | Facility: OTHER | Age: 63
Setting detail: DERMATOLOGY
End: 2023-04-14

## 2023-04-14 DIAGNOSIS — Z41.1 ENCOUNTER FOR COSMETIC SURGERY: ICD-10-CM

## 2023-04-14 DIAGNOSIS — Z48.89 ENCOUNTER FOR OTHER SPECIFIED SURGICAL AFTERCARE: ICD-10-CM

## 2023-04-14 PROCEDURE — 99024 POSTOP FOLLOW-UP VISIT: CPT

## 2023-04-14 PROCEDURE — ? BOTOX

## 2023-04-14 PROCEDURE — ? COUNSELING - POST-OP CHECK, BREAST IMPLANT EXCHANGE

## 2023-04-14 ASSESSMENT — LOCATION SIMPLE DESCRIPTION DERM: LOCATION SIMPLE: LEFT CHEEK

## 2023-04-14 ASSESSMENT — LOCATION DETAILED DESCRIPTION DERM: LOCATION DETAILED: LEFT INFERIOR CENTRAL MALAR CHEEK

## 2023-04-14 ASSESSMENT — LOCATION ZONE DERM: LOCATION ZONE: FACE

## 2023-04-14 NOTE — PROCEDURE: COUNSELING - POST-OP CHECK, BREAST IMPLANT EXCHANGE
Detail Level: Simple
Count Minor/Major Decisions Toward Mdm (Not Cosmetic)?: No
Add Postop Global No-Charge Code (87659)?: yes

## 2023-04-14 NOTE — PROCEDURE: BOTOX
Detail Level: Detailed
Additional Area 1 Location: crow?s feet
Price Per Unit In $ (Use Numbers Only, No Text Please.): 18
Expiration Date (Month Year): 04/30/2025
Forehead Units: 10
Glabellar Complex Units: 15
Consent: Written consent was obtained prior to the procedure. Risks, benefits, expectations and alternatives were discussed including, but not limited to, infection, bleeding, lid/brow ptosis, bruising, swelling, diplopia, temporary effects, incomplete chemical denervation and dissatisfaction with the cosmetic outcome. No guarantee or warranty was given or implied regarding longevity of results.
Additional Area 3 Units: 0
Dilution (U/0.1 Cc): 2.2
Bill Summary Price Listed Below, Or Bill Total Of Units X Price Per Unit?: Bill #Units x Price Per Unit
Postcare Instructions: Patient instructed to not lie down for 4 hours and limit physical activity for 24 hours. Patient instructed not to travel by airplane for 48 hours.
Show Price In Note?: yes
Reconstitution Date: 04/14/2023
Map Statment: See attached map for complete details
Lot #: [id number]

## 2023-10-06 ENCOUNTER — APPOINTMENT (RX ONLY)
Dept: URBAN - METROPOLITAN AREA OTHER 5 | Facility: OTHER | Age: 63
Setting detail: DERMATOLOGY
End: 2023-10-06

## 2023-10-06 DIAGNOSIS — L72.8 OTHER FOLLICULAR CYSTS OF THE SKIN AND SUBCUTANEOUS TISSUE: ICD-10-CM

## 2023-10-06 DIAGNOSIS — Z41.1 ENCOUNTER FOR COSMETIC SURGERY: ICD-10-CM

## 2023-10-06 DIAGNOSIS — Z48.89 ENCOUNTER FOR OTHER SPECIFIED SURGICAL AFTERCARE: ICD-10-CM

## 2023-10-06 PROCEDURE — ? COUNSELING - POST-OP CHECK, BREAST IMPLANT EXCHANGE

## 2023-10-06 PROCEDURE — ? NEEDLE ASPIRATION

## 2023-10-06 PROCEDURE — 99024 POSTOP FOLLOW-UP VISIT: CPT

## 2023-10-06 PROCEDURE — ? COUNSELING - CYST

## 2023-10-06 PROCEDURE — 10160 PNXR ASPIR ABSC HMTMA BULLA: CPT

## 2023-10-06 PROCEDURE — ? BOTOX

## 2023-10-06 ASSESSMENT — LOCATION DETAILED DESCRIPTION DERM
LOCATION DETAILED: RIGHT PROXIMAL DORSAL FOREARM
LOCATION DETAILED: RIGHT PROXIMAL DORSAL FOREARM
LOCATION DETAILED: LEFT INFERIOR CENTRAL MALAR CHEEK

## 2023-10-06 ASSESSMENT — LOCATION SIMPLE DESCRIPTION DERM
LOCATION SIMPLE: LEFT CHEEK
LOCATION SIMPLE: RIGHT FOREARM
LOCATION SIMPLE: RIGHT FOREARM

## 2023-10-06 ASSESSMENT — LOCATION ZONE DERM
LOCATION ZONE: ARM
LOCATION ZONE: ARM
LOCATION ZONE: FACE

## 2023-10-06 NOTE — PROCEDURE: COUNSELING - POST-OP CHECK, BREAST IMPLANT EXCHANGE
Detail Level: Simple
Count Minor/Major Decisions Toward Mdm (Not Cosmetic)?: No
Add Postop Global No-Charge Code (38549)?: yes

## 2023-10-06 NOTE — PROCEDURE: NEEDLE ASPIRATION
Detail Level: Detailed
Lesion Type: Cyst
Method: sterile needle
Consent: Informed consent was obtained and the patient verbalized full understanding. The risks, benefits, expectations and alternatives were reviewed in detail, including, but not limited to, the risks of delayed wound healing, infection, need for multiple incisions and drainages, recurrence, bleeding, injury to underlying structures (including nerves, devices, or organs), need for additional procedures, and pain.

## 2023-10-06 NOTE — PROCEDURE: BOTOX
Detail Level: Detailed
Additional Area 1 Location: crow?s feet
Price Per Unit In $ (Use Numbers Only, No Text Please.): 18
Expiration Date (Month Year): 05/31/25
Forehead Units: 10
Glabellar Complex Units: 5
Consent: Written consent was obtained prior to the procedure. Risks, benefits, expectations and alternatives were discussed including, but not limited to, infection, bleeding, lid/brow ptosis, bruising, swelling, diplopia, temporary effects, incomplete chemical denervation and dissatisfaction with the cosmetic outcome. No guarantee or warranty was given or implied regarding longevity of results.
Additional Area 3 Units: 0
Dilution (U/0.1 Cc): 2.2
Bill Summary Price Listed Below, Or Bill Total Of Units X Price Per Unit?: Bill #Units x Price Per Unit
Postcare Instructions: Patient instructed to not lie down for 4 hours and limit physical activity for 24 hours. Patient instructed not to travel by airplane for 48 hours.
Show Price In Note?: yes
Reconstitution Date: 10/4/23, 10/6/23
Map Statment: See attached map for complete details
Lot #: [id number]

## 2024-03-22 ENCOUNTER — APPOINTMENT (RX ONLY)
Dept: URBAN - METROPOLITAN AREA OTHER 5 | Facility: OTHER | Age: 64
Setting detail: DERMATOLOGY
End: 2024-03-22

## 2024-03-22 DIAGNOSIS — Z41.1 ENCOUNTER FOR COSMETIC SURGERY: ICD-10-CM

## 2024-03-22 PROCEDURE — ? BOTOX

## 2024-03-22 ASSESSMENT — LOCATION ZONE DERM: LOCATION ZONE: FACE

## 2024-03-22 ASSESSMENT — LOCATION DETAILED DESCRIPTION DERM: LOCATION DETAILED: LEFT INFERIOR CENTRAL MALAR CHEEK

## 2024-03-22 ASSESSMENT — LOCATION SIMPLE DESCRIPTION DERM: LOCATION SIMPLE: LEFT CHEEK

## 2024-03-22 NOTE — PROCEDURE: BOTOX
Detail Level: Detailed
Additional Area 1 Location: crow’s feet
Price Per Unit In $ (Use Numbers Only, No Text Please.): 20
Expiration Date (Month Year): 03/26
Forehead Units: 10
Consent: Written consent was obtained prior to the procedure. Risks, benefits, expectations and alternatives were discussed including, but not limited to, infection, bleeding, lid/brow ptosis, bruising, swelling, diplopia, temporary effects, incomplete chemical denervation and dissatisfaction with the cosmetic outcome. No guarantee or warranty was given or implied regarding longevity of results.
Additional Area 3 Units: 0
Dilution (U/0.1 Cc): 2.2
Bill Summary Price Listed Below, Or Bill Total Of Units X Price Per Unit?: Bill #Units x Price Per Unit
Postcare Instructions: Patient instructed to not lie down for 4 hours and limit physical activity for 24 hours. Patient instructed not to travel by airplane for 48 hours.
Show Price In Note?: yes
Reconstitution Date: 3/22/24
Map Statment: See attached map for complete details
Lot #: [id number]

## 2024-05-06 ENCOUNTER — APPOINTMENT (RX ONLY)
Dept: URBAN - METROPOLITAN AREA CLINIC 12 | Facility: CLINIC | Age: 64
Setting detail: DERMATOLOGY
End: 2024-05-06

## 2024-05-06 DIAGNOSIS — J34.2 DEVIATED NASAL SEPTUM: ICD-10-CM

## 2024-05-06 PROCEDURE — 99212 OFFICE O/P EST SF 10 MIN: CPT

## 2024-05-06 PROCEDURE — ? COUNSELING - SEPTAL DEVIATION/FUNCTIONAL RHINOPLASTY

## 2024-05-06 PROCEDURE — ? PATIENT SPECIFIC COUNSELING
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
## 2024-05-06 NOTE — HPI: NOSE (DEVIATED SEPTUM)
How Severe Are Your Symptoms?: moderate
Is This A New Presentation, Or A Follow-Up?: Deviated septum

## 2024-06-03 ENCOUNTER — APPOINTMENT (RX ONLY)
Dept: URBAN - METROPOLITAN AREA OTHER 5 | Facility: OTHER | Age: 64
Setting detail: DERMATOLOGY
End: 2024-06-03

## 2024-06-03 DIAGNOSIS — Z41.1 ENCOUNTER FOR COSMETIC SURGERY: ICD-10-CM

## 2024-06-03 PROCEDURE — ? PATIENT SPECIFIC COUNSELING

## 2024-06-03 PROCEDURE — ? BOTOX

## 2024-06-03 ASSESSMENT — LOCATION SIMPLE DESCRIPTION DERM: LOCATION SIMPLE: LEFT CHEEK

## 2024-06-03 ASSESSMENT — LOCATION ZONE DERM: LOCATION ZONE: FACE

## 2024-06-03 ASSESSMENT — LOCATION DETAILED DESCRIPTION DERM: LOCATION DETAILED: LEFT SUPERIOR CENTRAL BUCCAL CHEEK

## 2024-06-03 NOTE — PROCEDURE: BOTOX
Additional Area 3 Units: 0
Consent: Written consent was obtained prior to the procedure. Risks, benefits, expectations and alternatives were discussed including, but not limited to, infection, bleeding, lid/brow ptosis, bruising, swelling, diplopia, temporary effects, incomplete chemical denervation and dissatisfaction with the cosmetic outcome. No guarantee or warranty was given or implied regarding longevity of results.
Bill Summary Price Listed Below, Or Bill Total Of Units X Price Per Unit?: Bill #Units x Price Per Unit
Price Per Unit In $ (Use Numbers Only, No Text Please.): 20
Additional Area 1 Location: crow’s feet
Glabellar Complex Units: 15
Expiration Date (Month Year): 4/26
Dilution (U/0.1 Cc): 2.2
Reconstitution Date: 6/3/24
Lot #: [id number]
Forehead Units: 10
Map Statment: See attached map for complete details
Show Price In Note?: yes
Postcare Instructions: Patient instructed to not lie down for 4 hours and limit physical activity for 24 hours. Patient instructed not to travel by airplane for 48 hours.
Detail Level: Detailed

## 2024-08-12 ENCOUNTER — APPOINTMENT (RX ONLY)
Dept: URBAN - METROPOLITAN AREA OTHER 5 | Facility: OTHER | Age: 64
Setting detail: DERMATOLOGY
End: 2024-08-12

## 2024-08-12 DIAGNOSIS — Z41.1 ENCOUNTER FOR COSMETIC SURGERY: ICD-10-CM

## 2024-08-12 PROCEDURE — ? BOTOX

## 2024-08-12 ASSESSMENT — LOCATION ZONE DERM: LOCATION ZONE: FACE

## 2024-08-12 ASSESSMENT — LOCATION SIMPLE DESCRIPTION DERM: LOCATION SIMPLE: LEFT CHEEK

## 2024-08-12 ASSESSMENT — LOCATION DETAILED DESCRIPTION DERM: LOCATION DETAILED: LEFT SUPERIOR CENTRAL BUCCAL CHEEK

## 2024-08-12 NOTE — PROCEDURE: BOTOX
Additional Area 3 Units: 0
Consent: Written consent was obtained prior to the procedure. Risks, benefits, expectations and alternatives were discussed including, but not limited to, infection, bleeding, lid/brow ptosis, bruising, swelling, diplopia, temporary effects, incomplete chemical denervation and dissatisfaction with the cosmetic outcome. No guarantee or warranty was given or implied regarding longevity of results.
Additional Area 1 Units: 5
Bill Summary Price Listed Below, Or Bill Total Of Units X Price Per Unit?: Bill #Units x Price Per Unit
Price Per Unit In $ (Use Numbers Only, No Text Please.): 20
Additional Area 1 Location: crow’s feet
Glabellar Complex Units: 10
Expiration Date (Month Year): 4/26
Dilution (U/0.1 Cc): 2.2
Reconstitution Date: 6/3/24
Lot #: [id number]
Forehead Units: 25
Map Statment: See attached map for complete details
Show Price In Note?: yes
Postcare Instructions: Patient instructed to not lie down for 4 hours and limit physical activity for 24 hours. Patient instructed not to travel by airplane for 48 hours.
Detail Level: Detailed

## 2024-12-09 ENCOUNTER — APPOINTMENT (OUTPATIENT)
Dept: URBAN - METROPOLITAN AREA OTHER 5 | Facility: OTHER | Age: 64
Setting detail: DERMATOLOGY
End: 2024-12-09

## 2024-12-09 DIAGNOSIS — Z41.1 ENCOUNTER FOR COSMETIC SURGERY: ICD-10-CM

## 2024-12-09 PROCEDURE — ? BOTOX

## 2024-12-09 ASSESSMENT — LOCATION DETAILED DESCRIPTION DERM: LOCATION DETAILED: LEFT SUPERIOR CENTRAL BUCCAL CHEEK

## 2024-12-09 ASSESSMENT — LOCATION ZONE DERM: LOCATION ZONE: FACE

## 2024-12-09 ASSESSMENT — LOCATION SIMPLE DESCRIPTION DERM: LOCATION SIMPLE: LEFT CHEEK

## 2024-12-09 NOTE — PROCEDURE: BOTOX
Additional Area 3 Units: 0
Consent: Written consent was obtained prior to the procedure. Risks, benefits, expectations and alternatives were discussed including, but not limited to, infection, bleeding, lid/brow ptosis, bruising, swelling, diplopia, temporary effects, incomplete chemical denervation and dissatisfaction with the cosmetic outcome. No guarantee or warranty was given or implied regarding longevity of results.
Bill Summary Price Listed Below, Or Bill Total Of Units X Price Per Unit?: Bill #Units x Price Per Unit
Price Per Unit In $ (Use Numbers Only, No Text Please.): 20
Additional Area 1 Location: crow’s feet
Glabellar Complex Units: 10
Expiration Date (Month Year): 9/26
Dilution (U/0.1 Cc): 2.2
Reconstitution Date: 12/16/24
Lot #: [id number]
Map Statment: See attached map for complete details
Show Price In Note?: yes
Postcare Instructions: Patient instructed to not lie down for 4 hours and limit physical activity for 24 hours. Patient instructed not to travel by airplane for 48 hours.
Detail Level: Detailed

## 2025-01-03 ENCOUNTER — APPOINTMENT (OUTPATIENT)
Dept: URBAN - METROPOLITAN AREA OTHER 5 | Facility: OTHER | Age: 65
Setting detail: DERMATOLOGY
End: 2025-01-03

## 2025-01-03 DIAGNOSIS — Z42.8 ENCOUNTER FOR OTHER PLASTIC AND RECONSTRUCTIVE SURGERY FOLLOWING MEDICAL PROCEDURE OR HEALED INJURY: ICD-10-CM

## 2025-01-03 PROCEDURE — ? BOTOX

## 2025-01-03 PROCEDURE — ? PATIENT SPECIFIC COUNSELING

## 2025-01-03 NOTE — PROCEDURE: PATIENT SPECIFIC COUNSELING
Detail Level: Zone
Other (Free Text): Patient presents s/p Botox performed on 12/6/24, after visiting another office for initial Botox treatment that dropped her right eyebrow. Patient wants to know if more can be done to fix her eyebrow.

## 2025-01-03 NOTE — PROCEDURE: BOTOX
Additional Area 1 Units: 5
Bill Summary Price Listed Below, Or Bill Total Of Units X Price Per Unit?: Bill #Units x Price Per Unit
Platsyma Units: 0
Use Map Statement For Sites (Optional): Yes
Lot #: [id number]
Postcare Instructions: Patient instructed to not lie down for 4 hours and limit physical activity for 24 hours. Patient instructed not to travel by airplane for 48 hours.
Additional Area 1 Location: eyebrow
Expiration Date (Month Year): 9/26
Dilution (U/0.1 Cc): 2.2
Consent: Written consent was obtained prior to the procedure. Risks, benefits, expectations and alternatives were discussed including, but not limited to, infection, bleeding, lid/brow ptosis, bruising, swelling, diplopia, temporary effects, incomplete chemical denervation and dissatisfaction with the cosmetic outcome. No guarantee or warranty was given or implied regarding longevity of results.
Detail Level: Detailed
Price Per Unit In $ (Use Numbers Only, No Text Please.): 20
Map Statment: See attached map for complete details

## 2025-01-15 NOTE — PROCEDURE: PATIENT SPECIFIC COUNSELING
Noted.   
Detail Level: Zone
Other (Free Text): Dr. Morena Romero verbal dictation:\\n\\nShe is here 6 weeks post op facial surgery with fat grafting. She is concerned about her swelling bilateral lowerlids. There is also a residual suture behind the right ear that was looked at. It could be a suture cyst that has not surfaced as of yet. We will keep an eye on that area. I would like for her to start Dyazide for the swelling of her lowerlids and I will see her in 2 weeks.

## 2025-03-26 ENCOUNTER — APPOINTMENT (OUTPATIENT)
Dept: URBAN - METROPOLITAN AREA OTHER 5 | Facility: OTHER | Age: 65
Setting detail: DERMATOLOGY
End: 2025-03-26

## 2025-03-26 DIAGNOSIS — Z41.1 ENCOUNTER FOR COSMETIC SURGERY: ICD-10-CM

## 2025-03-26 PROCEDURE — ? BOTOX

## 2025-03-26 ASSESSMENT — LOCATION ZONE DERM: LOCATION ZONE: FACE

## 2025-03-26 ASSESSMENT — LOCATION DETAILED DESCRIPTION DERM: LOCATION DETAILED: LEFT SUPERIOR CENTRAL BUCCAL CHEEK

## 2025-03-26 ASSESSMENT — LOCATION SIMPLE DESCRIPTION DERM: LOCATION SIMPLE: LEFT CHEEK

## 2025-03-26 NOTE — PROCEDURE: BOTOX
Additional Area 3 Units: 0
Consent: Written consent was obtained prior to the procedure. Risks, benefits, expectations and alternatives were discussed including, but not limited to, infection, bleeding, lid/brow ptosis, bruising, swelling, diplopia, temporary effects, incomplete chemical denervation and dissatisfaction with the cosmetic outcome. No guarantee or warranty was given or implied regarding longevity of results.
Bill Summary Price Listed Below, Or Bill Total Of Units X Price Per Unit?: Bill #Units x Price Per Unit
Price Per Unit In $ (Use Numbers Only, No Text Please.): 20
Periorbital Skin Units: 15
Additional Area 1 Location: crow’s feet
Expiration Date (Month Year): 3/27
Dilution (U/0.1 Cc): 2.2
Lot #: [id number]
Forehead Units: 10
Summary Price $ (Use Numbers Only, No Text Please.): 800
Map Statment: See attached map for complete details
Show Price In Note?: yes
Postcare Instructions: Patient instructed to not lie down for 4 hours and limit physical activity for 24 hours. Patient instructed not to travel by airplane for 48 hours.
Detail Level: Detailed

## 2025-05-23 ENCOUNTER — APPOINTMENT (OUTPATIENT)
Dept: URBAN - METROPOLITAN AREA OTHER 5 | Facility: OTHER | Age: 65
Setting detail: DERMATOLOGY
End: 2025-05-23

## 2025-05-23 DIAGNOSIS — Z41.1 ENCOUNTER FOR COSMETIC SURGERY: ICD-10-CM

## 2025-05-23 PROCEDURE — ? BOTOX

## 2025-05-23 ASSESSMENT — LOCATION ZONE DERM: LOCATION ZONE: FACE

## 2025-05-23 ASSESSMENT — LOCATION DETAILED DESCRIPTION DERM: LOCATION DETAILED: LEFT SUPERIOR CENTRAL BUCCAL CHEEK

## 2025-05-23 ASSESSMENT — LOCATION SIMPLE DESCRIPTION DERM: LOCATION SIMPLE: LEFT CHEEK

## 2025-05-23 NOTE — PROCEDURE: BOTOX
Additional Area 3 Units: 0
Consent: Written consent was obtained prior to the procedure. Risks, benefits, expectations and alternatives were discussed including, but not limited to, infection, bleeding, lid/brow ptosis, bruising, swelling, diplopia, temporary effects, incomplete chemical denervation and dissatisfaction with the cosmetic outcome. No guarantee or warranty was given or implied regarding longevity of results.
Bill Summary Price Listed Below, Or Bill Total Of Units X Price Per Unit?: Bill #Units x Price Per Unit
Price Per Unit In $ (Use Numbers Only, No Text Please.): 20
Periorbital Skin Units: 15
Additional Area 1 Location: crow’s feet
Expiration Date (Month Year): 3/2027
Dilution (U/0.1 Cc): 2.2
Lot #: [id number], [id number]
Forehead Units: 10
Summary Price $ (Use Numbers Only, No Text Please.): 800
Map Statment: See attached map for complete details
Show Price In Note?: yes
Postcare Instructions: Patient instructed to not lie down for 4 hours and limit physical activity for 24 hours. Patient instructed not to travel by airplane for 48 hours.
Detail Level: Detailed